# Patient Record
Sex: MALE | Race: WHITE | NOT HISPANIC OR LATINO | RURAL
[De-identification: names, ages, dates, MRNs, and addresses within clinical notes are randomized per-mention and may not be internally consistent; named-entity substitution may affect disease eponyms.]

---

## 2017-04-18 ENCOUNTER — OFFICE VISIT (OUTPATIENT)
Dept: RETAIL CLINIC | Facility: CLINIC | Age: 52
End: 2017-04-18

## 2017-04-18 VITALS
HEART RATE: 74 BPM | WEIGHT: 155 LBS | SYSTOLIC BLOOD PRESSURE: 120 MMHG | TEMPERATURE: 98.1 F | BODY MASS INDEX: 24.91 KG/M2 | RESPIRATION RATE: 16 BRPM | OXYGEN SATURATION: 97 % | HEIGHT: 66 IN | DIASTOLIC BLOOD PRESSURE: 70 MMHG

## 2017-04-18 DIAGNOSIS — H00.025 HORDEOLUM INTERNUM OF LEFT LOWER EYELID: Primary | ICD-10-CM

## 2017-04-18 PROCEDURE — 99213 OFFICE O/P EST LOW 20 MIN: CPT | Performed by: NURSE PRACTITIONER

## 2017-04-18 RX ORDER — ERYTHROMYCIN 5 MG/G
OINTMENT OPHTHALMIC 3 TIMES DAILY
Qty: 3.5 G | Refills: 0 | Status: SHIPPED | OUTPATIENT
Start: 2017-04-18 | End: 2017-04-25

## 2017-04-18 NOTE — PATIENT INSTRUCTIONS
Stye  A stye is a bump on your eyelid caused by a bacterial infection. A stye can form inside the eyelid (internal stye) or outside the eyelid (external stye). An internal stye may be caused by an infected oil-producing gland inside your eyelid. An external stye may be caused by an infection at the base of your eyelash (hair follicle).  Styes are very common. Anyone can get them at any age. They usually occur in just one eye, but you may have more than one in either eye.   CAUSES   The infection is almost always caused by bacteria called Staphylococcus aureus. This is a common type of bacteria that lives on your skin.  RISK FACTORS  You may be at higher risk for a stye if you have had one before. You may also be at higher risk if you have:  · Diabetes.  · Long-term illness.  · Long-term eye redness.  · A skin condition called seborrhea.  · High fat levels in your blood (lipids).  SIGNS AND SYMPTOMS   Eyelid pain is the most common symptom of a stye. Internal styes are more painful than external styes. Other signs and symptoms may include:  · Painful swelling of your eyelid.  · A scratchy feeling in your eye.  · Tearing and redness of your eye.  · Pus draining from the stye.  DIAGNOSIS   Your health care provider may be able to diagnose a stye just by examining your eye. The health care provider may also check to make sure:  · You do not have a fever or other signs of a more serious infection.  · The infection has not spread to other parts of your eye or areas around your eye.  TREATMENT   Most styes will clear up in a few days without treatment. In some cases, you may need to use antibiotic drops or ointment to prevent infection. Your health care provider may have to drain the stye surgically if your stye is:  · Large.  · Causing a lot of pain.  · Interfering with your vision.  This can be done using a thin blade or a needle.   HOME CARE INSTRUCTIONS   · Take medicines only as directed by your health care  provider.  · Apply a clean, warm compress to your eye for 10 minutes, 4 times a day.  · Do not wear contact lenses or eye makeup until your stye has healed.  · Do not try to pop or drain the stye.  SEEK MEDICAL CARE IF:  · You have chills or a fever.  · Your stye does not go away after several days.  · Your stye affects your vision.  · Your eyeball becomes swollen, red, or painful.  MAKE SURE YOU:  · Understand these instructions.  · Will watch your condition.  · Will get help right away if you are not doing well or get worse.     This information is not intended to replace advice given to you by your health care provider. Make sure you discuss any questions you have with your health care provider.     Document Released: 09/27/2006 Document Revised: 01/08/2016 Document Reviewed: 04/03/2015  Elsevier Interactive Patient Education ©2016 [employer]

## 2017-04-18 NOTE — PROGRESS NOTES
"Helga Peñaloza is a 51 y.o. male.     Foreign Body in Eye   Incident onset: 4-15-17 while trimming some branches--sawdust or grass in the eye. The incident was reported. The incident was witnessed/reported by the patient. Pertinent negatives include no abdominal pain, chest pain, choking, congestion, cough, difficulty breathing, drainage, drooling, fever, hearing loss, nosebleeds, sore throat, trouble swallowing, vomiting or wheezing.        The following portions of the patient's history were reviewed and updated as appropriate: allergies, current medications, past medical history and past social history.    Review of Systems   Constitutional: Negative for activity change, appetite change, chills, fatigue and fever.   HENT: Negative for congestion, drooling, hearing loss, nosebleeds, postnasal drip, rhinorrhea, sore throat and trouble swallowing.    Eyes: Positive for pain ( mild across lower lid) and itching. Negative for photophobia, discharge, redness and visual disturbance.   Respiratory: Negative for cough, choking and wheezing.    Cardiovascular: Negative for chest pain, palpitations and leg swelling.   Gastrointestinal: Negative for abdominal pain, diarrhea, nausea and vomiting.   Musculoskeletal: Negative for myalgias, neck pain and neck stiffness.   Skin: Negative.    Neurological: Negative for dizziness and headaches.   Hematological: Negative for adenopathy.   Psychiatric/Behavioral: Negative.        Objective    /70  Pulse 74  Temp 98.1 °F (36.7 °C) (Tympanic)   Resp 16  Ht 66\" (167.6 cm)  Wt 155 lb (70.3 kg)  SpO2 97%  BMI 25.02 kg/m2    Physical Exam   Constitutional: He is oriented to person, place, and time. He appears well-developed and well-nourished. No distress.   Eyes: EOM are normal. Pupils are equal, round, and reactive to light. Left eye exhibits hordeolum ( x 2 internal on left lower lid, one at center and one at medial edge). Left eye exhibits no discharge and no " exudate. Left eye foreign body:  no obvious foreign body noted. Right conjunctiva is not injected. Left conjunctiva is injected ( mild injection along left lower conjunctiva).   Neck: Normal range of motion. Neck supple.   Lymphadenopathy:     He has no cervical adenopathy.   Neurological: He is alert and oriented to person, place, and time.   Psychiatric: He has a normal mood and affect. His behavior is normal.   Nursing note and vitals reviewed.      Assessment/Plan   Estrada was seen today for foreign body in eye.    Diagnoses and all orders for this visit:    Hordeolum internum of left lower eyelid  -     erythromycin (ROMYCIN) 5 MG/GM ophthalmic ointment; Administer  into the left eye 3 (Three) Times a Day for 7 days.    Warm compresses as needed  Wash hands frequently    Eye doctor if no improvement in 48-72 hours, sooner if severe eye pain or visual changes develop

## 2017-08-30 RX ORDER — NITROGLYCERIN 0.4 MG/1
0.4 TABLET SUBLINGUAL
Qty: 25 TABLET | Refills: 5 | Status: SHIPPED | OUTPATIENT
Start: 2017-08-30 | End: 2020-07-23

## 2020-07-23 ENCOUNTER — OFFICE VISIT (OUTPATIENT)
Dept: FAMILY MEDICINE CLINIC | Facility: CLINIC | Age: 55
End: 2020-07-23

## 2020-07-23 ENCOUNTER — TELEPHONE (OUTPATIENT)
Dept: FAMILY MEDICINE CLINIC | Facility: CLINIC | Age: 55
End: 2020-07-23

## 2020-07-23 DIAGNOSIS — L23.7 POISON IVY DERMATITIS: Primary | ICD-10-CM

## 2020-07-23 PROCEDURE — 99441 PR PHYS/QHP TELEPHONE EVALUATION 5-10 MIN: CPT | Performed by: NURSE PRACTITIONER

## 2020-07-23 RX ORDER — METHYLPREDNISOLONE 4 MG/1
TABLET ORAL
Qty: 1 EACH | Refills: 0 | Status: SHIPPED | OUTPATIENT
Start: 2020-07-23

## 2020-07-23 NOTE — PROGRESS NOTES
"Helga Peñaloza is a 55 y.o. male.     You have chosen to receive care through a telephone visit. Do you consent to use a telephone visit for your medical care today? Yes    PCP: none    CC: \"poison ivy\"    Denies hx of diabetes.     Rash   This is a new problem. Episode onset: 9-10 days ago. The problem has been gradually worsening since onset. Location: bilateral arms. The rash is characterized by blistering and itchiness (oozing). He was exposed to plant contact (started two days after trimming trees). Pertinent negatives include no anorexia, congestion, cough, diarrhea, eye pain, facial edema, fatigue, fever, joint pain, nail changes, rhinorrhea, shortness of breath, sore throat or vomiting. Treatments tried: Calahist, benadryl spray. The treatment provided mild relief.        The following portions of the patient's history were reviewed and updated as appropriate: allergies, current medications, past medical history, past social history, past surgical history and problem list.    Review of Systems   Constitutional: Negative.  Negative for fatigue and fever.   HENT: Negative for congestion, rhinorrhea and sore throat.    Eyes: Negative for pain.   Respiratory: Negative.  Negative for cough and shortness of breath.    Cardiovascular: Negative.    Gastrointestinal: Negative.  Negative for anorexia, diarrhea and vomiting.   Genitourinary: Negative for difficulty urinating.   Musculoskeletal: Negative for joint pain and myalgias.   Skin: Positive for rash. Negative for nail changes.   Neurological: Negative for dizziness and headache.     There were no vitals taken for this visit.    Objective   Physical Exam   Constitutional: He is oriented to person, place, and time.   Neurological: He is alert and oriented to person, place, and time.   Psychiatric: He has a normal mood and affect. His behavior is normal. Thought content normal.     No results found for this or any previous visit (from the past 24 " hour(s)).  No Images in the past 120 days found..      Assessment/Plan   Estrada was seen today for poison ivy.    Diagnoses and all orders for this visit:    Poison ivy dermatitis  -     methylPREDNISolone (MEDROL, JULIAN,) 4 MG tablet; Take as directed on package instructions.    Rx for Medrol  Cool compresses as needed  May continue with Calahist or Benadryl spray topically as needed  Monitor for s/s of secondary bacterial infection and f/u if these develop    See PCP or RTC if symptoms persist/worsen  See PCP for routine f/u visit and management of chronic medical conditions      This document has been electronically signed by CARSON Peraza on July 23, 2020 15:39,.    This visit has been rescheduled as a phone visit to comply with patient safety concerns in accordance with CDC recommendations. Total time of discussion was 5 minutes.

## 2020-07-23 NOTE — TELEPHONE ENCOUNTER
Pt state he has poison ivy and wants you to call him something for him to [facility], I did advise him that you may have to have a visit with him since it has been so long since you seen him, he states he doesn't like coming to the doctor, but I let him know that I would send you this message and call him back.

## 2020-07-23 NOTE — TELEPHONE ENCOUNTER
Mr. Peñaloza would like a call. Message from [facility] stated that he wouldn't give a reason for a return call Please advise